# Patient Record
Sex: FEMALE | Race: WHITE | NOT HISPANIC OR LATINO | Employment: FULL TIME | URBAN - METROPOLITAN AREA
[De-identification: names, ages, dates, MRNs, and addresses within clinical notes are randomized per-mention and may not be internally consistent; named-entity substitution may affect disease eponyms.]

---

## 2022-04-28 ENCOUNTER — OFFICE VISIT (OUTPATIENT)
Dept: FAMILY MEDICINE CLINIC | Facility: CLINIC | Age: 52
End: 2022-04-28

## 2022-04-28 VITALS
TEMPERATURE: 97.9 F | HEART RATE: 84 BPM | OXYGEN SATURATION: 100 % | BODY MASS INDEX: 27.83 KG/M2 | WEIGHT: 163 LBS | DIASTOLIC BLOOD PRESSURE: 84 MMHG | HEIGHT: 64 IN | SYSTOLIC BLOOD PRESSURE: 118 MMHG

## 2022-04-28 DIAGNOSIS — R00.2 HEART PALPITATIONS: Primary | ICD-10-CM

## 2022-04-28 DIAGNOSIS — Z13.220 SCREENING FOR LIPID DISORDERS: ICD-10-CM

## 2022-04-28 DIAGNOSIS — Z13.29 SCREENING FOR THYROID DISORDER: ICD-10-CM

## 2022-04-28 DIAGNOSIS — Z11.59 NEED FOR HEPATITIS C SCREENING TEST: ICD-10-CM

## 2022-04-28 DIAGNOSIS — Z12.31 BREAST CANCER SCREENING BY MAMMOGRAM: ICD-10-CM

## 2022-04-28 DIAGNOSIS — Z13.21 ENCOUNTER FOR VITAMIN DEFICIENCY SCREENING: ICD-10-CM

## 2022-04-28 PROCEDURE — 99204 OFFICE O/P NEW MOD 45 MIN: CPT | Performed by: NURSE PRACTITIONER

## 2022-04-29 ENCOUNTER — TELEPHONE (OUTPATIENT)
Dept: FAMILY MEDICINE CLINIC | Facility: CLINIC | Age: 52
End: 2022-04-29

## 2022-05-02 PROCEDURE — 93000 ELECTROCARDIOGRAM COMPLETE: CPT | Performed by: NURSE PRACTITIONER

## 2022-05-04 ENCOUNTER — PATIENT ROUNDING (BHMG ONLY) (OUTPATIENT)
Dept: FAMILY MEDICINE CLINIC | Facility: CLINIC | Age: 52
End: 2022-05-04

## 2022-05-04 NOTE — PROGRESS NOTES
May 4, 2022    Hello, may I speak with Yessenia Lopez?    My name is Mayra      [facility] am  [facility]  [address] [place] [id number] [phone].    Before we get started may I verify your date of birth? 1970    I am calling to officially welcome you to our practice and ask about your recent visit. Is this a good time to talk? yes    Tell me about your visit with us. What things went well?  patient is very pleased        We're always looking for ways to make our patients' experiences even better. Do you have recommendations on ways we may improve?  no    Overall were you satisfied with your first visit to our practice? yes       I appreciate you taking the time to speak with me today. Is there anything else I can do for you? no      Thank you, and have a great day.

## 2022-05-05 ENCOUNTER — HOSPITAL ENCOUNTER (OUTPATIENT)
Dept: CARDIOLOGY | Facility: HOSPITAL | Age: 52
Discharge: HOME | End: 2022-05-05
Admitting: NURSE PRACTITIONER

## 2022-05-05 DIAGNOSIS — R00.2 HEART PALPITATIONS: ICD-10-CM

## 2022-05-05 PROCEDURE — 93226 XTRNL ECG REC<48 HR SCAN A/R: CPT

## 2022-05-05 PROCEDURE — 93225 XTRNL ECG REC<48 HRS REC: CPT

## 2022-05-06 ENCOUNTER — APPOINTMENT (OUTPATIENT)
Dept: CARDIOLOGY | Facility: HOSPITAL | Age: 52
End: 2022-05-06

## 2022-05-09 LAB
MAXIMAL PREDICTED HEART RATE: 168 BPM
STRESS TARGET HR: 143 BPM

## 2022-05-09 PROCEDURE — 93227 XTRNL ECG REC<48 HR R&I: CPT | Performed by: INTERNAL MEDICINE

## 2022-05-16 ENCOUNTER — OFFICE VISIT (OUTPATIENT)
Dept: FAMILY MEDICINE CLINIC | Facility: CLINIC | Age: 52
End: 2022-05-16

## 2022-05-16 VITALS
BODY MASS INDEX: 27.66 KG/M2 | DIASTOLIC BLOOD PRESSURE: 70 MMHG | SYSTOLIC BLOOD PRESSURE: 118 MMHG | WEIGHT: 162 LBS | HEART RATE: 65 BPM | HEIGHT: 64 IN | TEMPERATURE: 97.7 F | OXYGEN SATURATION: 99 %

## 2022-05-16 DIAGNOSIS — R00.2 HEART PALPITATIONS: Primary | ICD-10-CM

## 2022-05-16 DIAGNOSIS — Z82.49 FAMILY HISTORY OF HEART DISEASE: ICD-10-CM

## 2022-05-16 PROCEDURE — 99213 OFFICE O/P EST LOW 20 MIN: CPT | Performed by: NURSE PRACTITIONER

## 2022-05-16 NOTE — PROGRESS NOTES
"Chief Complaint  Palpitations (3W fu heart palpitations - pt states \"they've maybe gotten a little better\" Very intermittent, some days no palpitations, other days they happen \"all day\" completed Holter monitor and EKG )    Masks/face shield/appropriate PPE were worn for the entirety of the visit by the patient, MA, and provider.     Subjective          Yessenia Lopez presents to [facility]  History of Present Illness  Yessenia Lopez is a 51 y.o. female who presents to the clinic today to follow-up on her recent symptoms of heart palpitations. Patient had an EKG performed last visit and 24 hour holter montior performed. She is still having symptoms. She has tried to keep an eye on when her symptoms occur. She has noticed her symptoms do occur more with stress. Her symptoms do not cause chest pain. She denies shortness of breath. She does notice her symptoms improve with exercise. She would describe her symptoms as a burning or aching in her central chest. She does report having an echocardiogram in her 30's for a murmur that she thinks revealed the tricuspid valve was not closing all the way, but she did not have regurgitation. She did start drinking more water. She does not eat right. She does drink carbonation and hot foods and is wondering if her symptoms could be related to acid reflux.     The 10-year ASCVD risk score (Adam DAVID Jr., et al., 2013) is: 1%    Values used to calculate the score:      Age: 51 years      Sex: Female      Is Non- : No      Diabetic: No      Tobacco smoker: No      Systolic Blood Pressure: 118 mmHg      Is BP treated: No      HDL Cholesterol: 75 mg/dL      Total Cholesterol: 226 mg/dL   Review of Systems   Constitutional: Negative.    HENT: Negative.    Eyes: Negative.    Respiratory: Negative.    Cardiovascular: Negative.    Gastrointestinal: Negative.    Endocrine: Negative.    Genitourinary: Negative.    Musculoskeletal: Negative.  " "  Skin: Negative.    Allergic/Immunologic: Negative.    Neurological: Negative.    Hematological: Negative.    Psychiatric/Behavioral: Negative.        Objective   Vital Signs:  /70   Pulse 65   Temp 97.7 °F (36.5 °C)   Ht 161.3 cm (63.5\")   Wt 73.5 kg (162 lb)   SpO2 99%   BMI 28.25 kg/m²           Physical Exam  Vitals reviewed.   Constitutional:       General: She is not in acute distress.     Appearance: Normal appearance. She is not ill-appearing, toxic-appearing or diaphoretic.   HENT:      Head: Normocephalic and atraumatic.   Eyes:      General: No scleral icterus.        Right eye: No discharge.         Left eye: No discharge.      Extraocular Movements: Extraocular movements intact.   Cardiovascular:      Rate and Rhythm: Normal rate and regular rhythm.      Heart sounds: Normal heart sounds.   Pulmonary:      Effort: No respiratory distress.      Breath sounds: Normal breath sounds.   Musculoskeletal:      Right lower leg: No edema.      Left lower leg: No edema.   Skin:     General: Skin is warm.   Neurological:      General: No focal deficit present.      Mental Status: She is alert and oriented to person, place, and time.      Motor: No weakness.      Gait: Gait normal.   Psychiatric:         Mood and Affect: Mood normal.         Behavior: Behavior normal.        Result Review :     Common labs    Common Labsle 5/6/22 5/6/22 5/6/22    1050 1050 1050   Glucose  83    BUN  18    Creatinine  0.90    Sodium  143    Potassium  4.9    Chloride  101    Calcium  9.9    Total Protein  7.4    Albumin  5.0 (A)    Total Bilirubin  0.7    Alkaline Phosphatase  90    AST (SGOT)  24    ALT (SGPT)  18    WBC 6.3     Hemoglobin 15.5     Hematocrit 47.9 (A)     Platelets 338     Total Cholesterol   226 (A)   Triglycerides   71   HDL Cholesterol   75   LDL Cholesterol    139 (A)   (A) Abnormal value            Data reviewed: Cardiology studies 24 hour holter monitor          Assessment and Plan    Diagnoses " and all orders for this visit:    1. Heart palpitations (Primary)  -     Ambulatory Referral to Cardiology    2. Family history of heart disease  -     Ambulatory Referral to Cardiology      Patient's 24 hr holter monitor was unremarkable. CMP did not show any electrolyte abnormalities. Thyroid function was within normal limits. Her hematocrit was slightly elevated, which could be related to dehydration. I encouraged patient to increase fluid intake. We can plan to revaluate this at a later date. Her total cholesterol and LDL were elevated. We discussed limiting fried foods and saturated fats. Due to persisitent symptoms and family history of heart disease, patient was referred to cardiology.          Follow Up   Return in about 3 months (around 8/16/2022) for Annual physical.  Patient was given instructions and counseling regarding her condition or for health maintenance advice. Please see specific information pulled into the AVS if appropriate.       Answers for HPI/ROS submitted by the patient on 5/14/2022  Please describe your symptoms.: Follow up appointment related to test results for heart palpitations  Have you had these symptoms before?: No  How long have you been having these symptoms?: Greater than 2 weeks  Please list any medications you are currently taking for this condition.: None  Please describe any probable cause for these symptoms. : Not sure. That’s why I have this appointment. :)  What is the primary reason for your visit?: Other    Electronically signed by AMIE Rush, 05/18/22, 7:00 AM EDT.

## 2022-05-17 ENCOUNTER — HOSPITAL ENCOUNTER (OUTPATIENT)
Dept: MAMMOGRAPHY | Facility: HOSPITAL | Age: 52
Discharge: HOME | End: 2022-05-17
Admitting: NURSE PRACTITIONER

## 2022-05-17 DIAGNOSIS — Z12.31 BREAST CANCER SCREENING BY MAMMOGRAM: ICD-10-CM

## 2022-05-17 PROCEDURE — 77063 BREAST TOMOSYNTHESIS BI: CPT

## 2022-05-17 PROCEDURE — 77067 SCR MAMMO BI INCL CAD: CPT

## 2022-06-24 ENCOUNTER — OFFICE VISIT (OUTPATIENT)
Dept: CARDIOLOGY | Facility: CLINIC | Age: 52
End: 2022-06-24

## 2022-06-24 VITALS
HEART RATE: 62 BPM | DIASTOLIC BLOOD PRESSURE: 76 MMHG | SYSTOLIC BLOOD PRESSURE: 120 MMHG | WEIGHT: 159.8 LBS | HEIGHT: 64 IN | BODY MASS INDEX: 27.28 KG/M2

## 2022-06-24 DIAGNOSIS — Z82.49 FAMILY HISTORY OF PREMATURE CORONARY ARTERY DISEASE: ICD-10-CM

## 2022-06-24 DIAGNOSIS — E78.00 HYPERCHOLESTEROLEMIA: ICD-10-CM

## 2022-06-24 DIAGNOSIS — R00.2 PALPITATIONS: Primary | ICD-10-CM

## 2022-06-24 PROCEDURE — 99203 OFFICE O/P NEW LOW 30 MIN: CPT | Performed by: INTERNAL MEDICINE

## 2022-06-24 PROCEDURE — 93000 ELECTROCARDIOGRAM COMPLETE: CPT | Performed by: INTERNAL MEDICINE

## 2022-06-24 NOTE — PROGRESS NOTES
PATIENTINFORMATION    Date of Office Visit: 2022  Encounter Provider: Nadeem Ferro MD  Place of Service: [facility]ARDIOLOG[facility]  Patient Name: Yessenia Lopez  : 1970    Subjective:     Encounter Date:2022      Patient ID: Yessenia Lopez is a 51 y.o. female.    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  She describes episodes as heart pumping out of her chest/vibrating sometimes in her throat.  Episodes usually fleeting and without any associated symptoms.  She obtains is related to stress as episodes get worse during stressful situations.  Overall it has improved since school closure.  She used to exercise regularly on a treadmill walking/running for about an hour without any exertional symptoms but stopped exercising fearing it will make palpitations worse.  She denies rest or exertional chest discomfort.  No shortness of breath, orthopnea, PND or extremity swelling.  No prior diagnosis of heart disease.  Family history significant for coronary artery disease/bypass surgery with their mom in their 40s but she was heavy smoker.    No current tobacco, recreational drug use or alcohol abuse.      ROS  All systems reviewed and negative except as noted in HPI.    Past Medical History:   Diagnosis Date   • Allergic    • Heart murmur        Past Surgical History:   Procedure Laterality Date   • EYE SURGERY Right     lazy eye 1970       Social History     Socioeconomic History   • Marital status:    Tobacco Use   • Smoking status: Never Smoker   • Smokeless tobacco: Never Used   Vaping Use   • Vaping Use: Never used   Substance and Sexual Activity   • Alcohol use: Yes     Comment: monthy   • Drug use: Never   • Sexual activity: Defer       Family History   Problem Relation Age of Onset  "  • Stroke Mother    • Heart disease Mother    • No Known Problems Brother    • Hypertension Brother    • Asthma Daughter    • No Known Problems Son    • Breast cancer Maternal Aunt    • Hyperlipidemia Paternal Aunt    • Breast cancer Paternal Aunt    • Hyperlipidemia Paternal Uncle    • No Known Problems Maternal Grandmother    • Heart disease Maternal Grandfather    • Hypertension Paternal Grandmother            ECG 12 Lead    Date/Time: 6/24/2022 11:24 AM  Performed by: Nadeem Ferro MD  Authorized by: Nadeem Ferro MD   Comparison: compared with previous ECG from 5/4/2022  Similar to previous ECG  Rhythm: sinus rhythm  Rate: normal  Conduction: conduction normal  ST Segments: ST segments normal  T Waves: T waves normal  QRS axis: normal  Other: no other findings    Clinical impression: normal ECG               Objective:     /76 (BP Location: Left arm, Patient Position: Sitting)   Pulse 62   Ht 161.3 cm (63.5\")   Wt 72.5 kg (159 lb 12.8 oz)   BMI 27.86 kg/m²  Body mass index is 27.86 kg/m².     Constitutional:       General: Not in acute distress.     Appearance: Well-developed. Not diaphoretic.   Eyes:      Pupils: Pupils are equal, round, and reactive to light.   HENT:      Head: Normocephalic and atraumatic.   Neck:      Thyroid: No thyromegaly.   Pulmonary:      Effort: Pulmonary effort is normal. No respiratory distress.      Breath sounds: Normal breath sounds. No wheezing. No rales.   Chest:      Chest wall: Not tender to palpatation.   Cardiovascular:      Normal rate. Regular rhythm.      No gallop.   Pulses:     Intact distal pulses.   Edema:     Peripheral edema absent.   Abdominal:      General: Bowel sounds are normal. There is no distension.      Palpations: Abdomen is soft.      Tenderness: There is no guarding.   Musculoskeletal: Normal range of motion.         General: No deformity.      Cervical back: Normal range of motion and neck supple. Skin:     General: Skin is " warm and dry.      Findings: No rash.   Neurological:      Mental Status: Alert and oriented to person, place, and time.      Cranial Nerves: No cranial nerve deficit.      Deep Tendon Reflexes: Reflexes are normal and symmetric.   Psychiatric:         Judgment: Judgment normal.         Review Of Data: I have reviewed pertinent recent labs, images and documents and pertinent findings included in HPI or assessment below.    Lipid Panel    Lipid Panel 5/6/22   Total Cholesterol 226 (A)   Triglycerides 71   HDL Cholesterol 75   VLDL Cholesterol 12   LDL Cholesterol  139 (A)   (A) Abnormal value                Assessment/Plan:         1.  Benign palpitation-normal Holter monitor  Overall improved  No further testing    2.  Hypercholesterolemia-she will modify diet and to restart exercising regularly and will follow up with her PCP.    3.  Family Struve premature coronary artery disease    She is agreeable to get vascular screening to check her carotids and coronary calcium score.  With any plaque formation she would benefit from starting statin.    Follow-up with cardiology clinic as needed.  Diagnosis and plan of care discussed with patient and verbalized understanding.            Your medication list      as of June 24, 2022 11:44 AM     You have not been prescribed any medications.             Nadeem Ferro MD  06/24/22  11:44 EDT

## 2022-09-22 ENCOUNTER — TRANSCRIBE ORDERS (OUTPATIENT)
Dept: CARDIOLOGY | Facility: CLINIC | Age: 52
End: 2022-09-22

## 2022-09-22 DIAGNOSIS — Z13.6 ENCOUNTER FOR SCREENING FOR VASCULAR DISEASE: Primary | ICD-10-CM

## 2022-10-19 DIAGNOSIS — R00.2 PALPITATIONS: Primary | ICD-10-CM

## 2022-10-27 DIAGNOSIS — R00.2 PALPITATIONS: Primary | ICD-10-CM

## 2022-12-12 DIAGNOSIS — R00.2 PALPITATIONS: Primary | ICD-10-CM

## 2022-12-12 DIAGNOSIS — R07.9 CHEST PAIN WITH MODERATE RISK FOR CARDIAC ETIOLOGY: ICD-10-CM

## 2022-12-12 NOTE — PROGRESS NOTES
Ms. Lopez communicated clinic with concerns of palpitations and intermittent retrosternal chest pain.  Palpitations have become more frequent and she also reports intermittent episodes of retrosternal chest discomfort lasting for 30-45 minutes.  Family history significant for premature coronary artery disease and she also has personal history of hypercholesterolemia.  She will get event monitor and also get stress echocardiogram.

## 2022-12-29 ENCOUNTER — HOSPITAL ENCOUNTER (OUTPATIENT)
Dept: CARDIOLOGY | Facility: HOSPITAL | Age: 52
Discharge: HOME | End: 2022-12-29
Admitting: INTERNAL MEDICINE

## 2022-12-29 VITALS — WEIGHT: 159 LBS | HEIGHT: 63 IN | BODY MASS INDEX: 28.17 KG/M2

## 2022-12-29 DIAGNOSIS — R07.9 CHEST PAIN WITH MODERATE RISK FOR CARDIAC ETIOLOGY: ICD-10-CM

## 2022-12-29 LAB
AORTIC ARCH: 2.4 CM
AORTIC DIMENSIONLESS INDEX: 0.8 (DI)
ASCENDING AORTA: 2.3 CM
BH CV ECHO MEAS - ACS: 1.8 CM
BH CV ECHO MEAS - AO MAX PG: 9 MMHG
BH CV ECHO MEAS - AO MEAN PG: 5 MMHG
BH CV ECHO MEAS - AO ROOT DIAM: 2.2 CM
BH CV ECHO MEAS - AO V2 MAX: 149 CM/SEC
BH CV ECHO MEAS - AO V2 VTI: 31 CM
BH CV ECHO MEAS - AVA(I,D): 2.5 CM2
BH CV ECHO MEAS - EDV(MOD-SP2): 122 ML
BH CV ECHO MEAS - EDV(MOD-SP4): 125 ML
BH CV ECHO MEAS - EF(MOD-BP): 64 %
BH CV ECHO MEAS - EF(MOD-SP2): 61 %
BH CV ECHO MEAS - EF(MOD-SP4): 67 %
BH CV ECHO MEAS - ESV(MOD-SP2): 47 ML
BH CV ECHO MEAS - ESV(MOD-SP4): 42 ML
BH CV ECHO MEAS - IVS/LVPW: 0.9 CM
BH CV ECHO MEAS - IVSD: 0.8 CM
BH CV ECHO MEAS - LAT PEAK E' VEL: 13.7 CM/SEC
BH CV ECHO MEAS - LV MAX PG: 6 MMHG
BH CV ECHO MEAS - LV MEAN PG: 3 MMHG
BH CV ECHO MEAS - LV V1 MAX: 120 CM/SEC
BH CV ECHO MEAS - LV V1 VTI: 25 CM
BH CV ECHO MEAS - LVIDD: 4.5 CM
BH CV ECHO MEAS - LVIDS: 2.8 CM
BH CV ECHO MEAS - LVOT DIAM: 2 CM
BH CV ECHO MEAS - LVPWD: 0.9 CM
BH CV ECHO MEAS - MED PEAK E' VEL: 12.5 CM/SEC
BH CV ECHO MEAS - MV A DUR: 145 SEC
BH CV ECHO MEAS - MV A MAX VEL: 92 CM/SEC
BH CV ECHO MEAS - MV DEC TIME: 185 MSEC
BH CV ECHO MEAS - MV E MAX VEL: 93 CM/SEC
BH CV ECHO MEAS - MV E/A: 1
BH CV ECHO MEAS - MV MAX PG: 5 MMHG
BH CV ECHO MEAS - MV MEAN PG: 2 MMHG
BH CV ECHO MEAS - MV P1/2T: 84 MSEC
BH CV ECHO MEAS - MV V2 VTI: 32 CM
BH CV ECHO MEAS - MVA(P1/2T): 2.6 CM2
BH CV ECHO MEAS - PA ACC SLOPE: 633 CM/SEC2
BH CV ECHO MEAS - PA ACC TIME: 137 SEC
BH CV ECHO MEAS - PA V2 MAX: 83 CM/SEC
BH CV ECHO MEAS - PULM A REVS DUR: 198 SEC
BH CV ECHO MEAS - PULM A REVS VEL: 25 CM/SEC
BH CV ECHO MEAS - PULM DIAS VEL: 37 CM/SEC
BH CV ECHO MEAS - PULM S/D: 0.9
BH CV ECHO MEAS - PULM SYS VEL: 33 CM/SEC
BH CV ECHO MEAS - RV V1 MAX: 67 CM/SEC
BH CV ECHO MEAS - RV V1 VTI: 16 CM
BH CV ECHO MEAS - SUP REN AO DIAM: 1.7 CM
BH CV ECHO MEAS - TAPSE (>1.6): 2.4 CM
BH CV ECHO MEASUREMENTS AVERAGE E/E' RATIO: 7.1
BH CV ECHO SHUNT ASSESSMENT PERFORMED (HIDDEN SCRIPTING): 1
BH CV STRESS BP STAGE 1: NORMAL
BH CV STRESS BP STAGE 2: NORMAL
BH CV STRESS DURATION MIN STAGE 1: 3
BH CV STRESS DURATION MIN STAGE 2: 3
BH CV STRESS DURATION SEC STAGE 1: 0
BH CV STRESS DURATION SEC STAGE 2: 0
BH CV STRESS ECHO POST STRESS EJECTION FRACTION EF: 73 %
BH CV STRESS GRADE STAGE 1: 10
BH CV STRESS GRADE STAGE 2: 12
BH CV STRESS HR STAGE 1: 145
BH CV STRESS HR STAGE 2: 162
BH CV STRESS METS STAGE 1: 5
BH CV STRESS METS STAGE 2: 7.5
BH CV STRESS PROTOCOL 1: NORMAL
BH CV STRESS RECOVERY BP: NORMAL MMHG
BH CV STRESS RECOVERY HR: 98 BPM
BH CV STRESS SPEED STAGE 1: 1.7
BH CV STRESS SPEED STAGE 2: 2.5
BH CV STRESS STAGE 1: 1
BH CV STRESS STAGE 2: 2
BH CV XLRA - TDI S': 14.4 CM/SEC
LEFT ATRIUM VOLUME INDEX: 13 ML/M2
MAXIMAL PREDICTED HEART RATE: 167 BPM
PERCENT MAX PREDICTED HR: 97.01 %
SINUS: 2.4 CM
STJ: 2.1 CM
STRESS BASELINE BP: NORMAL MMHG
STRESS BASELINE HR: 68 BPM
STRESS PERCENT HR: 114 %
STRESS POST ESTIMATED WORKLOAD: 7.5 METS
STRESS POST EXERCISE DUR MIN: 6 MIN
STRESS POST EXERCISE DUR SEC: 0 SEC
STRESS POST PEAK BP: NORMAL MMHG
STRESS POST PEAK HR: 162 BPM
STRESS TARGET HR: 142 BPM

## 2022-12-29 PROCEDURE — 93325 DOPPLER ECHO COLOR FLOW MAPG: CPT | Performed by: INTERNAL MEDICINE

## 2022-12-29 PROCEDURE — 93018 CV STRESS TEST I&R ONLY: CPT | Performed by: INTERNAL MEDICINE

## 2022-12-29 PROCEDURE — 93350 STRESS TTE ONLY: CPT

## 2022-12-29 PROCEDURE — 93350 STRESS TTE ONLY: CPT | Performed by: INTERNAL MEDICINE

## 2022-12-29 PROCEDURE — 93325 DOPPLER ECHO COLOR FLOW MAPG: CPT

## 2022-12-29 PROCEDURE — 93017 CV STRESS TEST TRACING ONLY: CPT

## 2022-12-29 PROCEDURE — 93016 CV STRESS TEST SUPVJ ONLY: CPT | Performed by: INTERNAL MEDICINE

## 2022-12-29 PROCEDURE — 93320 DOPPLER ECHO COMPLETE: CPT | Performed by: INTERNAL MEDICINE

## 2022-12-29 PROCEDURE — 93352 ADMIN ECG CONTRAST AGENT: CPT | Performed by: INTERNAL MEDICINE

## 2022-12-29 PROCEDURE — 93320 DOPPLER ECHO COMPLETE: CPT

## 2022-12-29 PROCEDURE — 25010000002 PERFLUTREN (DEFINITY) 8.476 MG IN SODIUM CHLORIDE (PF) 0.9 % 10 ML INJECTION: Performed by: INTERNAL MEDICINE

## 2022-12-29 RX ADMIN — PERFLUTREN 2 ML: 6.52 INJECTION, SUSPENSION INTRAVENOUS at 14:23

## 2023-01-27 ENCOUNTER — HOSPITAL ENCOUNTER (OUTPATIENT)
Dept: CT IMAGING | Facility: HOSPITAL | Age: 53
Discharge: HOME | End: 2023-01-27

## 2023-01-27 ENCOUNTER — HOSPITAL ENCOUNTER (OUTPATIENT)
Dept: CARDIOLOGY | Facility: HOSPITAL | Age: 53
Discharge: HOME | End: 2023-01-27

## 2023-01-27 DIAGNOSIS — Z13.6 ENCOUNTER FOR SCREENING FOR VASCULAR DISEASE: ICD-10-CM

## 2023-01-27 LAB
BH CV VAS STUDY COMMENTS THYROID NODULE LT 1ST MEASURE: 0.68 CM
BH CV VAS STUDY COMMENTS THYROID NODULE LT 2ND MEASUR: 0.83 CM
BH CV XLRA MEAS - MID AO DIAM: 1.6 CM
BH CV XLRA MEAS - PAD LEFT ABI PT: 1.13
BH CV XLRA MEAS - PAD LEFT ARM: 123 MMHG
BH CV XLRA MEAS - PAD LEFT LEG PT: 156 MMHG
BH CV XLRA MEAS - PAD RIGHT ABI PT: 1.12
BH CV XLRA MEAS - PAD RIGHT ARM: 138 MMHG
BH CV XLRA MEAS - PAD RIGHT LEG PT: 155 MMHG
BH CV XLRA MEAS LEFT DIST CCA EDV: 25.5 CM/SEC
BH CV XLRA MEAS LEFT DIST CCA PSV: 92.6 CM/SEC
BH CV XLRA MEAS LEFT MID CCA PSV: 91 CM/SEC
BH CV XLRA MEAS LEFT MID ICA PSV: 102 CM/SEC
BH CV XLRA MEAS LEFT PROX ECA PSV: 1.1 CM/SEC
BH CV XLRA MEAS LEFT PROX ICA EDV: -33.5 CM/SEC
BH CV XLRA MEAS LEFT PROX ICA PSV: -102 CM/SEC
BH CV XLRA MEAS RIGHT DIST CCA EDV: 31.1 CM/SEC
BH CV XLRA MEAS RIGHT DIST CCA PSV: 93.2 CM/SEC
BH CV XLRA MEAS RIGHT MID CCA PSV: 93 CM/SEC
BH CV XLRA MEAS RIGHT MID ICA PSV: 91 CM/SEC
BH CV XLRA MEAS RIGHT PROX ECA PSV: 1 CM/SEC
BH CV XLRA MEAS RIGHT PROX ICA EDV: -21.7 CM/SEC
BH CV XLRA MEAS RIGHT PROX ICA PSV: -90.7 CM/SEC
MAXIMAL PREDICTED HEART RATE: 167 BPM
STRESS TARGET HR: 142 BPM

## 2023-01-27 PROCEDURE — 93799 UNLISTED CV SVC/PROCEDURE: CPT

## 2023-01-27 PROCEDURE — 75571 CT HRT W/O DYE W/CA TEST: CPT

## 2023-01-29 NOTE — PROGRESS NOTES
Sarbjit notify Yessenia that I have reviewed results of vascular screening . She has very mild coronary calcification - she would benefit from starting statin . I have called in for atorvastatin   Incidentally noted small thyroid nodule that needs fu with pcp. I have copied her APRN Olga.  Let me know if she has questions for me  FU with in one year. Guadalupe Copied    Thank you

## 2023-01-30 ENCOUNTER — TELEPHONE (OUTPATIENT)
Dept: CARDIOLOGY | Facility: CLINIC | Age: 53
End: 2023-01-30
Payer: COMMERCIAL

## 2023-01-30 DIAGNOSIS — E04.1 THYROID NODULE: Primary | ICD-10-CM

## 2023-01-30 NOTE — TELEPHONE ENCOUNTER
Called and left VM. Will continue to try to reach patient.     Nadine Joe RN  Triage [facility]

## 2023-01-30 NOTE — TELEPHONE ENCOUNTER
----- Message from Nadeem Ferro MD sent at 1/29/2023  6:58 PM EST -----  Pleas notify Yessenia that I have reviewed results of vascular screening . She has very mild coronary calcification - she would benefit from starting statin . I have called in for atorvastatin   Incidentally noted small thyroid nodule that needs fu with pcp. I have copied her APRN Olga.  Let me know if she has questions for me  FU with in one year. Guadalupe Copied    Thank you

## 2023-01-30 NOTE — TELEPHONE ENCOUNTER
I spoke with Yessenia Lopez and updated pt on results/recommendations from provider.  Pt verbalized understanding and has no further questions at this time.    I do not see an order for the atorvastatin.  I did confirm the pharmacy we have on file is correct for pt if you can place the prescription for her.    Thank you,    Sophy Rothman, RN  Triage [facility]  01/30/23 12:43 EST

## 2023-01-31 DIAGNOSIS — E78.00 HYPERCHOLESTEROLEMIA: Primary | ICD-10-CM

## 2023-01-31 RX ORDER — ATORVASTATIN CALCIUM 20 MG/1
20 TABLET, FILM COATED ORAL DAILY
Qty: 90 TABLET | Refills: 3 | Status: SHIPPED | OUTPATIENT
Start: 2023-01-31

## 2023-01-31 NOTE — TELEPHONE ENCOUNTER
I spoke with the pt and informed her that you'd sent in the prescription.  Pt voiced that she wants to try lifestyle modifications prior to starting the statin.  I informed pt she could try this and educated her on what this looks like.  She's going to come back in 3 months for blood work to recheck lipids and then, if at that point she needs medication assistance, she's agreeable to starting a statin.    Thank you,    Sophy Rothman RN  [facility]riage [facility]  01/31/23 10:37 EST

## 2023-01-31 NOTE — TELEPHONE ENCOUNTER
I tried to call Yessenia Lopez but there was no answer.  Left a voicemail asking patient to call back.  Will continue to try to reach pt.    Thank you,    Sophy Rothman RN  [place] [facility]  01/31/23 10:26 EST

## 2023-02-03 ENCOUNTER — HOSPITAL ENCOUNTER (OUTPATIENT)
Dept: ULTRASOUND IMAGING | Facility: HOSPITAL | Age: 53
Discharge: HOME | End: 2023-02-03
Admitting: NURSE PRACTITIONER
Payer: COMMERCIAL

## 2023-02-03 DIAGNOSIS — E04.1 THYROID NODULE: ICD-10-CM

## 2023-02-03 PROCEDURE — 76536 US EXAM OF HEAD AND NECK: CPT

## 2023-02-06 ENCOUNTER — OFFICE VISIT (OUTPATIENT)
Dept: FAMILY MEDICINE CLINIC | Facility: CLINIC | Age: 53
End: 2023-02-06
Payer: COMMERCIAL

## 2023-02-06 VITALS
DIASTOLIC BLOOD PRESSURE: 74 MMHG | BODY MASS INDEX: 28.35 KG/M2 | SYSTOLIC BLOOD PRESSURE: 122 MMHG | WEIGHT: 160 LBS | HEIGHT: 63 IN | HEART RATE: 78 BPM | TEMPERATURE: 97.7 F | OXYGEN SATURATION: 99 %

## 2023-02-06 DIAGNOSIS — R00.2 HEART PALPITATIONS: Primary | ICD-10-CM

## 2023-02-06 PROCEDURE — 99214 OFFICE O/P EST MOD 30 MIN: CPT | Performed by: NURSE PRACTITIONER

## 2023-02-06 NOTE — PROGRESS NOTES
"Chief Complaint  Palpitations (FU intermittent daily heart palpitations. Nonfasting )    Subjective        Yessenia Lopez presents to [facility]  History of Present Illness  Yessenia Lopez is a 52 y.o. female who presents to clinic today to follow-up on heart palpitations.  Patient has been referred to cardiology.  She has had several tests performed including cardiac event monitor, echocardiogram, and CT cardiac calcium score.  Carotid artery ultrasound was normal bilaterally.  AAA screening was negative.  PAD screening showed normal arterial pressures.  Stress test negative for clinical evidence of myocardial ischemia. EKG and holter were normal. She did have PACs and PVCs on event monitor, but overall relatively benign. Her heart palpitations are not daily, but some weeks she has them everyday. Her heart palpations are sporadic in nature and there is no known trigger. It feels like her heart skips a beat. She also feels the fluttering in her throat.Her CT cardiac calcium score did show a thyroid nodule. Thyroid ultrasound performed. She is \"always cold\", but this is not uncommon for her. She is not yet in menopause. Her last her period was 10 months ago. She is not having hot flashes. She denies severe anxiety or mood swings.    Review of Systems   Constitutional: Negative for chills, fatigue and fever.   Respiratory: Negative for cough, shortness of breath and wheezing.    Cardiovascular: Positive for palpitations.   Endocrine: Negative for cold intolerance and heat intolerance.      Objective   Vital Signs:  /74   Pulse 78   Temp 97.7 °F (36.5 °C)   Ht 160 cm (63\")   Wt 72.6 kg (160 lb)   SpO2 99%   BMI 28.34 kg/m²   Estimated body mass index is 28.34 kg/m² as calculated from the following:    Height as of this encounter: 160 cm (63\").    Weight as of this encounter: 72.6 kg (160 lb).             Physical Exam  Vitals reviewed.   Constitutional:       General: She is not in " acute distress.     Appearance: Normal appearance. She is not ill-appearing, toxic-appearing or diaphoretic.   HENT:      Head: Normocephalic and atraumatic.   Cardiovascular:      Rate and Rhythm: Normal rate and regular rhythm.      Heart sounds: Normal heart sounds.   Pulmonary:      Effort: No respiratory distress.      Breath sounds: Normal breath sounds. No stridor. No wheezing or rhonchi.   Lymphadenopathy:      Cervical: Cervical adenopathy (Left side) present.   Neurological:      General: No focal deficit present.      Mental Status: She is alert and oriented to person, place, and time.      Motor: No weakness.      Gait: Gait normal.   Psychiatric:         Mood and Affect: Mood normal.         Behavior: Behavior normal.        Result Review :       Thyroid Panel With TSH (05/06/2022 10:50)  T4, free (05/06/2022 10:50)  CBC w AUTO Differential (05/06/2022 10:50)  Comprehensive metabolic panel (05/06/2022 10:50)  Vitamin D 25 Hydroxy (05/06/2022 10:50)  Lipid Panel (05/06/2022 10:50)  Hepatitis C antibody (05/06/2022 10:50)        US Thyroid (02/03/2023 13:31)  CT Cardiac Calcium Score Without Dye (01/27/2023 08:38)  Vascular screening (bundle) CAR (01/27/2023 08:30)  Cardiac Event Monitor (12/29/2022 14:28)               Assessment and Plan   Diagnoses and all orders for this visit:    1. Heart palpitations (Primary)  -     TSH  -     T4, free  -     CBC w AUTO Differential  -     Magnesium      So far, cardiac workup for patient's palpitations is unremarkable.  There has been evidence of some PVCs and PACs on the event monitor.  She has very mild calcifications on the cardiac CT scan and was started on a cholesterol-lowering medicine by her cardiologist.  Thyroid ultrasound revealed left thyroid ultrasound that did not meet criteria for biopsy. I am considered patient's perimenoupasual state could be a contributing factor. We discussed treatment options, like propranolol to help with her heart  palpitations and PVCs.  We did review potential side effects of this medicine.  Patient will consider this and reach out if she would like to start this medicine.  We will also check thyroid function and magnesium level.  Would advise repeating CMP and lipid panel in 6 to 8 weeks as she was started on atorvastatin.  Patient aware when to seek emergency care.       I spent 35 minutes caring for Yessenia on this date of service. This time includes time spent by me in the following activities:reviewing tests, obtaining and/or reviewing a separately obtained history, performing a medically appropriate examination and/or evaluation  and counseling and educating the patient/family/caregiver  Follow Up   Return in about 3 months (around 5/6/2023) for Annual physical- fasting labs 1-2 weeks prior.  Patient was given instructions and counseling regarding her condition or for health maintenance advice. Please see specific information pulled into the AVS if appropriate.     Electronically signed by AMIE Rush, 02/08/23, 7:26 AM EST.

## 2023-02-07 LAB
BASOPHILS # BLD AUTO: 0.04 10*3/MM3 (ref 0–0.2)
BASOPHILS NFR BLD AUTO: 0.6 % (ref 0–1.5)
EOSINOPHIL # BLD AUTO: 0.06 10*3/MM3 (ref 0–0.4)
EOSINOPHIL NFR BLD AUTO: 0.8 % (ref 0.3–6.2)
ERYTHROCYTE [DISTWIDTH] IN BLOOD BY AUTOMATED COUNT: 13 % (ref 12.3–15.4)
HCT VFR BLD AUTO: 42.1 % (ref 34–46.6)
HGB BLD-MCNC: 13.9 G/DL (ref 12–15.9)
IMM GRANULOCYTES # BLD AUTO: 0.01 10*3/MM3 (ref 0–0.05)
IMM GRANULOCYTES NFR BLD AUTO: 0.1 % (ref 0–0.5)
LYMPHOCYTES # BLD AUTO: 3.33 10*3/MM3 (ref 0.7–3.1)
LYMPHOCYTES NFR BLD AUTO: 46.1 % (ref 19.6–45.3)
MAGNESIUM SERPL-MCNC: 2.2 MG/DL (ref 1.6–2.6)
MCH RBC QN AUTO: 28.3 PG (ref 26.6–33)
MCHC RBC AUTO-ENTMCNC: 33 G/DL (ref 31.5–35.7)
MCV RBC AUTO: 85.7 FL (ref 79–97)
MONOCYTES # BLD AUTO: 0.6 10*3/MM3 (ref 0.1–0.9)
MONOCYTES NFR BLD AUTO: 8.3 % (ref 5–12)
NEUTROPHILS # BLD AUTO: 3.18 10*3/MM3 (ref 1.7–7)
NEUTROPHILS NFR BLD AUTO: 44.1 % (ref 42.7–76)
NRBC BLD AUTO-RTO: 0 /100 WBC (ref 0–0.2)
PLATELET # BLD AUTO: 288 10*3/MM3 (ref 140–450)
RBC # BLD AUTO: 4.91 10*6/MM3 (ref 3.77–5.28)
T4 FREE SERPL-MCNC: 1.28 NG/DL (ref 0.93–1.7)
TSH SERPL DL<=0.005 MIU/L-ACNC: 3.34 UIU/ML (ref 0.27–4.2)
WBC # BLD AUTO: 7.22 10*3/MM3 (ref 3.4–10.8)

## 2023-05-15 ENCOUNTER — OFFICE VISIT (OUTPATIENT)
Dept: ORTHOPEDIC SURGERY | Facility: CLINIC | Age: 53
End: 2023-05-15
Payer: COMMERCIAL

## 2023-05-15 VITALS — WEIGHT: 160 LBS | HEIGHT: 63 IN | TEMPERATURE: 97.3 F | BODY MASS INDEX: 28.35 KG/M2

## 2023-05-15 DIAGNOSIS — S92.355A CLOSED NONDISPLACED FRACTURE OF FIFTH METATARSAL BONE OF LEFT FOOT, INITIAL ENCOUNTER: ICD-10-CM

## 2023-05-15 DIAGNOSIS — T14.90XA INJURY: Primary | ICD-10-CM

## 2023-05-15 NOTE — PROGRESS NOTES
New foot      Patient: Yessenia Lopez        YOB: 1970        Chief Complaints: foot pain left      History of Present Illness: This a 52-year-old female who stepped off the side of the bathtub landed on a shoe and rolled her ankle yesterday she had pain on the lateral aspect of the foot no prior history of injury she does have some swelling she is on crutches she has a short boot states if she does not put weight on it it does not hurt again no prior history of of injury    HPI      Allergies: No Known Allergies    Medications:   Home Medications:  Current Outpatient Medications on File Prior to Visit   Medication Sig   • atorvastatin (LIPITOR) 20 MG tablet Take 1 tablet by mouth Daily.     No current facility-administered medications on file prior to visit.     Current Medications:  Scheduled Meds:  Continuous Infusions:No current facility-administered medications for this visit.    PRN Meds:.    Past Medical History:   Diagnosis Date   • Allergic    • Fracture, radius 2003   • Heart murmur    • Neck strain 2022        Past Surgical History:   Procedure Laterality Date   • EYE SURGERY Right     lazy eye 1970        Social History     Occupational History   • Not on file   Tobacco Use   • Smoking status: Never   • Smokeless tobacco: Never   Vaping Use   • Vaping Use: Never used   Substance and Sexual Activity   • Alcohol use: Yes     Alcohol/week: 2.0 standard drinks     Types: 2 Glasses of wine per week     Comment: monthy   • Drug use: Never   • Sexual activity: Yes     Partners: Male      Social History     Social History Narrative   • Not on file        Family History   Problem Relation Age of Onset   • Stroke Mother    • Heart disease Mother    • Anesthesia problems Mother    • No Known Problems Brother    • Hypertension Brother    • Asthma Daughter    • No Known Problems Son    • Breast cancer Maternal Aunt    • Hyperlipidemia Paternal Aunt    • Breast cancer Paternal Aunt    • Hyperlipidemia  "Paternal Uncle    • No Known Problems Maternal Grandmother    • Heart disease Maternal Grandfather    • Hypertension Paternal Grandmother              Review of Systems:     Review of Systems      Physical Exam: 52 y.o. female  General Appearance:    Alert, cooperative, in no acute distress                   Vitals:    05/15/23 1006   Temp: 97.3 °F (36.3 °C)   Weight: 72.6 kg (160 lb)   Height: 160 cm (63\")   PainSc:   1      Patient is alert and read ×3 no acute distress appears her above-listed at height weight and age.  Affect is normal respiratory rate is normal unlabored. Heart rate regular rate rhythm, sclera, dentition and hearing are normal for the purpose of this exam.        Ortho Exam exam of her left foot she does have some local swelling and palpable tenderness over the base of the fifth metatarsal ankle exam is otherwise normal calf is soft and nontender           Radiology:   AP, Lateral  And oblique of the left foot  ordered/reviewed to evaluate pain. I have no comparative films that she does have a essentially nondisplaced fracture of the  base of the fifth metatarsal      Assessment/Plan: Base of the fifth metatarsal fracture.  This is not a Velásquez fracture this is a metatarsal fracture she should do just fine in the boot I think she can progress her weightbearing ice I should see her back in 2 weeks for an x-ray just to ensure this is not displaced                          "

## 2023-05-18 ENCOUNTER — PATIENT ROUNDING (BHMG ONLY) (OUTPATIENT)
Dept: ORTHOPEDIC SURGERY | Facility: CLINIC | Age: 53
End: 2023-05-18
Payer: COMMERCIAL

## 2023-05-18 NOTE — PROGRESS NOTES
A [employer] Message has been sent to the patient for PATIENT ROUNDING with [facility]

## 2023-05-19 ENCOUNTER — TELEPHONE (OUTPATIENT)
Dept: ORTHOPEDIC SURGERY | Facility: CLINIC | Age: 53
End: 2023-05-19
Payer: COMMERCIAL

## 2023-05-19 DIAGNOSIS — M79.89 PAIN AND SWELLING OF LEFT LOWER LEG: ICD-10-CM

## 2023-05-19 DIAGNOSIS — M79.662 PAIN AND SWELLING OF LEFT LOWER LEG: ICD-10-CM

## 2023-05-19 DIAGNOSIS — M79.662 PAIN AND SWELLING OF LEFT LOWER LEG: Primary | ICD-10-CM

## 2023-05-19 DIAGNOSIS — M79.89 PAIN AND SWELLING OF LEFT LOWER LEG: Primary | ICD-10-CM

## 2023-05-19 NOTE — TELEPHONE ENCOUNTER
Preliminary noninvasive venous study of the left lower extremity is positive for a DVT.  Have discussed with both the patient and her .  We will plan to start her on Eliquis and she understands that she will be on anticoagulation for 6 weeks possibly as long as 12 weeks.  New prescription was sent to [facility] pharmacy at [phone] or Eliquis 5 mg, #60, directions are to take 2 tablets p.o. twice daily x1 week and then 1 tablet p.o. twice daily.  Refill x1.  Have discussed with them that she can begin normal activities tomorrow after she is started on the anticoagulants today.  I would recommend that she avoid having her leg hang in a dependent position for long periods of time.  When she is elevating her extremity she should be aggressive with ankle pumps.  We also discussed the risks of being on anticoagulants.  She should avoid any NSAIDs or any alcohol while on the Eliquis.  Have also instructed her that if she should notice any blood in her urine or stools or bruising without trauma she needs to notify the office.  I have also advised her that if she has any chest pain or shortness of breath she needs to go immediately to the emergency room.  I have left it open for them to call if they have any other questions or concerns

## 2023-05-22 ENCOUNTER — TELEPHONE (OUTPATIENT)
Dept: ORTHOPEDIC SURGERY | Facility: CLINIC | Age: 53
End: 2023-05-22
Payer: COMMERCIAL

## 2023-05-30 ENCOUNTER — TELEPHONE (OUTPATIENT)
Dept: ORTHOPEDIC SURGERY | Facility: CLINIC | Age: 53
End: 2023-05-30
Payer: COMMERCIAL

## 2023-05-30 NOTE — TELEPHONE ENCOUNTER
From your last appt, you wanted to see patient in 2 weeks.  That qwas 5-15/23.  Next appt is not til Lo 8.  Is that ok?  Or, do you want me to get her in sooner?

## 2023-06-07 NOTE — PROGRESS NOTES
Foot Fx F/u      Patient: Yessenia Lopez        YOB: 1970        Chief Complaints: foot pain left      History of Present Illness:   Patient is here for follow-up of left base of the fifth metatarsal fracture she also had a blood clot and is currently being treated with an anticoagulant she states the pain in the foot is getting better still having some pain in the calf she feels like the short boot is hitting right at the clot  HPI      Allergies: No Known Allergies    Medications:   Home Medications:  Current Outpatient Medications on File Prior to Visit   Medication Sig    apixaban (ELIQUIS) 5 MG tablet tablet Take 2 tablets p.o. twice daily x1 week then 1 tablet p.o. twice daily    atorvastatin (LIPITOR) 20 MG tablet Take 1 tablet by mouth Daily.     No current facility-administered medications on file prior to visit.     Current Medications:  Scheduled Meds:  Continuous Infusions:No current facility-administered medications for this visit.    PRN Meds:.    Past Medical History:   Diagnosis Date    Allergic     Fracture, radius 2003    Heart murmur     Neck strain 2022        Past Surgical History:   Procedure Laterality Date    EYE SURGERY Right     lazy eye 1970        Social History     Occupational History    Not on file   Tobacco Use    Smoking status: Never    Smokeless tobacco: Never   Vaping Use    Vaping Use: Never used   Substance and Sexual Activity    Alcohol use: Yes     Alcohol/week: 2.0 standard drinks     Types: 2 Glasses of wine per week     Comment: monthy    Drug use: Never    Sexual activity: Yes     Partners: Male      Social History     Social History Narrative    Not on file        Family History   Problem Relation Age of Onset    Stroke Mother     Heart disease Mother     Anesthesia problems Mother     No Known Problems Brother     Hypertension Brother     Asthma Daughter     No Known Problems Son     Breast cancer  "Maternal Aunt     Hyperlipidemia Paternal Aunt     Breast cancer Paternal Aunt     Hyperlipidemia Paternal Uncle     No Known Problems Maternal Grandmother     Heart disease Maternal Grandfather     Hypertension Paternal Grandmother              Physical Exam: 52 y.o. female  General Appearance:    Alert, cooperative, in no acute distress                   Vitals:    06/08/23 1126   Temp: 97.3 °F (36.3 °C)   TempSrc: Temporal   Weight: 68.1 kg (150 lb 1.6 oz)   Height: 160 cm (63\")   PainSc:   1      Patient is alert and read ×3 no acute distress appears her above-listed at height weight and age.  Affect is normal respiratory rate is normal unlabored. Heart rate regular rate rhythm, sclera, dentition and hearing are normal for the purpose of this exam.        Physical Exam:    exam of the foot reveals no swelling no redness no erythema.  Patient still has mild tenderness over the area the fracture and good distal pulses good capillary refill Is soft and nontender ankle exam is otherwise normal.           Radiology:   AP, Lateral  And oblique of the foot  ordered/reviewed to evaluate pain. I have compared to previous films      she has a healing spiral fracture of the distal fifth metatarsal                                 Assessment/Plan:          Plan is to continue the boot we will give her tall boot and see if that is more comfortable I will see her back 3 weeks with an x-ray she will continue her anticoagulants                            Follow up Foot Fx   "

## 2023-06-08 ENCOUNTER — OFFICE VISIT (OUTPATIENT)
Dept: ORTHOPEDIC SURGERY | Facility: CLINIC | Age: 53
End: 2023-06-08
Payer: COMMERCIAL

## 2023-06-08 VITALS — TEMPERATURE: 97.3 F | BODY MASS INDEX: 26.59 KG/M2 | WEIGHT: 150.1 LBS | HEIGHT: 63 IN

## 2023-06-08 DIAGNOSIS — S92.355A CLOSED NONDISPLACED FRACTURE OF FIFTH METATARSAL BONE OF LEFT FOOT, INITIAL ENCOUNTER: Primary | ICD-10-CM

## 2023-06-09 DIAGNOSIS — M79.89 PAIN AND SWELLING OF LEFT LOWER LEG: Primary | ICD-10-CM

## 2023-06-09 DIAGNOSIS — M79.662 PAIN AND SWELLING OF LEFT LOWER LEG: Primary | ICD-10-CM

## 2023-06-13 DIAGNOSIS — I82.432 ACUTE DEEP VEIN THROMBOSIS (DVT) OF POPLITEAL VEIN OF LEFT LOWER EXTREMITY: Primary | ICD-10-CM

## 2023-08-09 NOTE — PROGRESS NOTES
Patient: Yessenia Lopez  YOB: 1970  Date of Service: 8/9/2023    Chief Complaints: Left foot pain    Subjective:    History of Present Illness: Pt is seen in the office today with complaints of left foot pain she is here follow base of the fifth metatarsal fracture she is 3 months out she states he is doing more she is actually using the elliptical and walking symptoms are improved       Allergies: No Known Allergies    Medications:   Home Medications:  Current Outpatient Medications on File Prior to Visit   Medication Sig    apixaban (Eliquis) 5 MG tablet tablet TAKE 2 TABLETS BY MOUTH TWICE A DAY FOR 1 WEEK THEN TAKE 1 TABLET BY MOUTH TWICE A DAY THEREAFTER    atorvastatin (LIPITOR) 20 MG tablet Take 1 tablet by mouth Daily.     No current facility-administered medications on file prior to visit.     Current Medications:  Scheduled Meds:  Continuous Infusions:No current facility-administered medications for this visit.    PRN Meds:.    I have reviewed the patient's medical history in detail and updated the computerized patient record.  Review and summarization of old records include:    Past Medical History:   Diagnosis Date    Allergic     Fracture, foot     Fracture, radius 2003    Heart murmur     Neck strain 2022        Past Surgical History:   Procedure Laterality Date    EYE SURGERY Right     lazy eye 1970        Social History     Occupational History    Not on file   Tobacco Use    Smoking status: Never    Smokeless tobacco: Never   Vaping Use    Vaping Use: Never used   Substance and Sexual Activity    Alcohol use: Yes     Alcohol/week: 2.0 standard drinks     Types: 2 Glasses of wine per week     Comment: monthy    Drug use: Never    Sexual activity: Yes     Partners: Male      Social History     Social History Narrative    Not on file        Family History   Problem Relation Age of Onset    Stroke Mother     Heart disease Mother     Anesthesia problems Mother     No Known Problems Brother      Hypertension Brother     Asthma Daughter     No Known Problems Son     Breast cancer Maternal Aunt     Hyperlipidemia Paternal Aunt     Breast cancer Paternal Aunt     Hyperlipidemia Paternal Uncle     No Known Problems Maternal Grandmother     Heart disease Maternal Grandfather     Hypertension Paternal Grandmother        ROS: 14 point review of systems was performed and was negative except for documented findings in HPI and today's encounter.     Allergies: No Known Allergies  Constitutional:  Denies fever, shaking or chills   Eyes:  Denies change in visual acuity   HENT:  Denies nasal congestion or sore throat   Respiratory:  Denies cough or shortness of breath   Cardiovascular:  Denies chest pain or severe LE edema   GI:  Denies abdominal pain, nausea, vomiting, bloody stools or diarrhea   Musculoskeletal:  Numbness, tingling, or loss of motor function only as noted above in history of present illness.  : Denies painful urination or hematuria  Integument:  Denies rash, lesion or ulceration   Neurologic:  Denies headache or focal weakness  Endocrine:  Denies lymphadenopathy  Psych:  Denies confusion or change in mental status   Hem:  Denies active bleeding      Physical Exam: 52 y.o. female  Wt Readings from Last 3 Encounters:   06/08/23 68.1 kg (150 lb 1.6 oz)   05/15/23 72.6 kg (160 lb)   02/06/23 72.6 kg (160 lb)       There is no height or weight on file to calculate BMI.    There were no vitals filed for this visit.  Vital signs reviewed.   General Appearance:    Alert, cooperative, in no acute distress                    Ortho exam    Exam of her left foot she has a normal gait she has a very very mild palpable tenderness over the base of the fifth which is improved      X-rays AP lateral and oblique of the left foot were taken to evaluate her fracture she has a healing fracture still see the fracture line but is definitely healing      Assessment: Left base of fifth metatarsal fracture plan is for her  to progress her activities Tj aguilarray her in about 8 weeks just to prove that this is completely consolidated    Plan: Plan is as above  Follow up as indicated.  Ice, elevate, and rest as needed.  Discussed conservative measures of pain control including ice, bracing.  Also talked about the importance of strengthening and maintaining ideal body weight    Abigail Queen M.D.

## 2023-08-10 ENCOUNTER — OFFICE VISIT (OUTPATIENT)
Dept: ORTHOPEDIC SURGERY | Facility: CLINIC | Age: 53
End: 2023-08-10
Payer: COMMERCIAL

## 2023-08-10 VITALS — WEIGHT: 156.2 LBS | TEMPERATURE: 98.2 F | BODY MASS INDEX: 27.68 KG/M2 | HEIGHT: 63 IN

## 2023-08-10 DIAGNOSIS — S92.355A CLOSED NONDISPLACED FRACTURE OF FIFTH METATARSAL BONE OF LEFT FOOT, INITIAL ENCOUNTER: Primary | ICD-10-CM

## 2023-11-01 ENCOUNTER — TRANSCRIBE ORDERS (OUTPATIENT)
Dept: ADMINISTRATIVE | Facility: HOSPITAL | Age: 53
End: 2023-11-01
Payer: COMMERCIAL

## 2023-11-01 DIAGNOSIS — Z12.31 VISIT FOR SCREENING MAMMOGRAM: Primary | ICD-10-CM

## 2023-11-03 ENCOUNTER — OFFICE VISIT (OUTPATIENT)
Dept: ORTHOPEDIC SURGERY | Facility: CLINIC | Age: 53
End: 2023-11-03
Payer: COMMERCIAL

## 2023-11-03 VITALS — WEIGHT: 150.3 LBS | HEIGHT: 63 IN | TEMPERATURE: 98.1 F | BODY MASS INDEX: 26.63 KG/M2

## 2023-11-03 DIAGNOSIS — S92.355A CLOSED NONDISPLACED FRACTURE OF FIFTH METATARSAL BONE OF LEFT FOOT, INITIAL ENCOUNTER: Primary | ICD-10-CM

## 2023-11-03 NOTE — PROGRESS NOTES
"Foot follow Up      Patient: Yessenia Lopez        YOB: 1970      Chief Complaints: foot pain left      History of Present Illness: Patient is here for follow-up of the base of the fifth metatarsal fracture she states she is doing well she has marked decrease in her symptoms is really nothing that she cannot do she did this in May      Allergies: No Known Allergies    Medications:   Home Medications:  Current Outpatient Medications on File Prior to Visit   Medication Sig    [DISCONTINUED] apixaban (Eliquis) 5 MG tablet tablet TAKE 2 TABLETS BY MOUTH TWICE A DAY FOR 1 WEEK THEN TAKE 1 TABLET BY MOUTH TWICE A DAY THEREAFTER (Patient not taking: Reported on 8/10/2023)    [DISCONTINUED] atorvastatin (LIPITOR) 20 MG tablet Take 1 tablet by mouth Daily.     No current facility-administered medications on file prior to visit.     Current Medications:  Scheduled Meds:  Continuous Infusions:No current facility-administered medications for this visit.    PRN Meds:.          Physical Exam: 53 y.o. female  General Appearance:    Alert, cooperative, in no acute distress                   Vitals:    11/03/23 1439   Temp: 98.1 °F (36.7 °C)   TempSrc: Temporal   Weight: 68.2 kg (150 lb 4.8 oz)   Height: 160 cm (63\")   PainSc: 0-No pain      Patient is alert and oriented ×3 no acute distress normal mood physical exam.  Physical exam of the elbow, incisions look good there is no erythema,no signs or sx of infection.    Ortho Exam  Exam of her she has no palpable tenderness Achilles length is good    Assessment    healed base of the fifth metatarsal fracture    Xrays: X-rays AP lateral oblique of the left foot were taken to evaluate her fracture to compare to last x-rays she has a healed base of the fifth metatarsal fracture    Plan: She can progress her activities as tolerated and follow-up as needed  "

## 2023-11-07 ENCOUNTER — HOSPITAL ENCOUNTER (OUTPATIENT)
Dept: MAMMOGRAPHY | Facility: HOSPITAL | Age: 53
Discharge: HOME | End: 2023-11-07
Admitting: OBSTETRICS & GYNECOLOGY
Payer: COMMERCIAL

## 2023-11-07 DIAGNOSIS — Z12.31 VISIT FOR SCREENING MAMMOGRAM: ICD-10-CM

## 2023-11-07 PROCEDURE — 77063 BREAST TOMOSYNTHESIS BI: CPT

## 2023-11-07 PROCEDURE — 77067 SCR MAMMO BI INCL CAD: CPT
